# Patient Record
Sex: MALE | Race: BLACK OR AFRICAN AMERICAN | Employment: UNEMPLOYED | URBAN - METROPOLITAN AREA
[De-identification: names, ages, dates, MRNs, and addresses within clinical notes are randomized per-mention and may not be internally consistent; named-entity substitution may affect disease eponyms.]

---

## 2024-09-04 ENCOUNTER — APPOINTMENT (OUTPATIENT)
Dept: GENERAL RADIOLOGY | Facility: HOSPITAL | Age: 39
End: 2024-09-04
Attending: NURSE PRACTITIONER

## 2024-09-04 ENCOUNTER — HOSPITAL ENCOUNTER (EMERGENCY)
Facility: HOSPITAL | Age: 39
Discharge: HOME | End: 2024-09-04

## 2024-09-04 VITALS
WEIGHT: 190 LBS | HEART RATE: 85 BPM | TEMPERATURE: 97 F | OXYGEN SATURATION: 98 % | RESPIRATION RATE: 16 BRPM | DIASTOLIC BLOOD PRESSURE: 105 MMHG | SYSTOLIC BLOOD PRESSURE: 154 MMHG

## 2024-09-04 DIAGNOSIS — S41.112A LACERATION OF LEFT UPPER EXTREMITY, INITIAL ENCOUNTER: Primary | ICD-10-CM

## 2024-09-04 DIAGNOSIS — S99.911A ANKLE INJURY, RIGHT, INITIAL ENCOUNTER: ICD-10-CM

## 2024-09-04 PROCEDURE — 99283 EMERGENCY DEPT VISIT LOW MDM: CPT

## 2024-09-04 PROCEDURE — 90471 IMMUNIZATION ADMIN: CPT

## 2024-09-04 PROCEDURE — 73610 X-RAY EXAM OF ANKLE: CPT | Performed by: NURSE PRACTITIONER

## 2024-09-04 RX ORDER — BICTEGRAVIR SODIUM, EMTRICITABINE, AND TENOFOVIR ALAFENAMIDE FUMARATE 50; 200; 25 MG/1; MG/1; MG/1
1 TABLET ORAL DAILY
COMMUNITY

## 2024-09-04 NOTE — ED INITIAL ASSESSMENT (HPI)
Pt to ED via triage c/o approx 4-5 in lac to left upper arm by dull sword pta. Pt applied neosporin to lac. Bleeding controlled in triage.  Also c/o right ankle pain, able to ambulate.

## 2024-12-04 ENCOUNTER — HOSPITAL ENCOUNTER (EMERGENCY)
Facility: HOSPITAL | Age: 39
Discharge: HOME | End: 2024-12-04
Attending: EMERGENCY MEDICINE

## 2024-12-04 ENCOUNTER — APPOINTMENT (OUTPATIENT)
Dept: CT IMAGING | Facility: HOSPITAL | Age: 39
End: 2024-12-04
Attending: EMERGENCY MEDICINE

## 2024-12-04 ENCOUNTER — APPOINTMENT (OUTPATIENT)
Dept: GENERAL RADIOLOGY | Facility: HOSPITAL | Age: 39
End: 2024-12-04
Attending: EMERGENCY MEDICINE

## 2024-12-04 VITALS
SYSTOLIC BLOOD PRESSURE: 129 MMHG | RESPIRATION RATE: 16 BRPM | TEMPERATURE: 100 F | OXYGEN SATURATION: 97 % | HEART RATE: 72 BPM | WEIGHT: 190.06 LBS | DIASTOLIC BLOOD PRESSURE: 83 MMHG

## 2024-12-04 DIAGNOSIS — R74.01 TRANSAMINITIS: ICD-10-CM

## 2024-12-04 DIAGNOSIS — B34.9 VIRAL SYNDROME: Primary | ICD-10-CM

## 2024-12-04 LAB
ALBUMIN SERPL-MCNC: 4.3 G/DL (ref 3.2–4.8)
ALP LIVER SERPL-CCNC: 110 U/L
ALT SERPL-CCNC: 59 U/L
ANION GAP SERPL CALC-SCNC: 8 MMOL/L (ref 0–18)
AST SERPL-CCNC: 77 U/L (ref ?–34)
BASOPHILS # BLD AUTO: 0.07 X10(3) UL (ref 0–0.2)
BASOPHILS NFR BLD AUTO: 0.8 %
BILIRUB DIRECT SERPL-MCNC: 0.2 MG/DL (ref ?–0.3)
BILIRUB SERPL-MCNC: 0.6 MG/DL (ref 0.3–1.2)
BILIRUB UR QL: NEGATIVE
BUN BLD-MCNC: 11 MG/DL (ref 9–23)
BUN/CREAT SERPL: 8.5 (ref 10–20)
CALCIUM BLD-MCNC: 9.5 MG/DL (ref 8.7–10.4)
CHLORIDE SERPL-SCNC: 106 MMOL/L (ref 98–112)
CLARITY UR: CLEAR
CO2 SERPL-SCNC: 26 MMOL/L (ref 21–32)
CREAT BLD-MCNC: 1.3 MG/DL
DEPRECATED RDW RBC AUTO: 39.5 FL (ref 35.1–46.3)
EGFRCR SERPLBLD CKD-EPI 2021: 73 ML/MIN/1.73M2 (ref 60–?)
EOSINOPHIL # BLD AUTO: 0.06 X10(3) UL (ref 0–0.7)
EOSINOPHIL NFR BLD AUTO: 0.7 %
ERYTHROCYTE [DISTWIDTH] IN BLOOD BY AUTOMATED COUNT: 13.1 % (ref 11–15)
FLUAV + FLUBV RNA SPEC NAA+PROBE: NEGATIVE
FLUAV + FLUBV RNA SPEC NAA+PROBE: NEGATIVE
GLUCOSE BLD-MCNC: 80 MG/DL (ref 70–99)
GLUCOSE UR-MCNC: NORMAL MG/DL
HAV IGM SER QL: NONREACTIVE
HBV CORE IGM SER QL: NONREACTIVE
HBV SURFACE AG SERPL QL IA: NONREACTIVE
HCT VFR BLD AUTO: 38 %
HCV AB SERPL QL IA: NONREACTIVE
HGB BLD-MCNC: 12.7 G/DL
HGB UR QL STRIP.AUTO: NEGATIVE
IMM GRANULOCYTES # BLD AUTO: 0.03 X10(3) UL (ref 0–1)
IMM GRANULOCYTES NFR BLD: 0.3 %
KETONES UR-MCNC: NEGATIVE MG/DL
LEUKOCYTE ESTERASE UR QL STRIP.AUTO: NEGATIVE
LYMPHOCYTES # BLD AUTO: 1.7 X10(3) UL (ref 1–4)
LYMPHOCYTES NFR BLD AUTO: 19.7 %
MCH RBC QN AUTO: 27.5 PG (ref 26–34)
MCHC RBC AUTO-ENTMCNC: 33.4 G/DL (ref 31–37)
MCV RBC AUTO: 82.4 FL
MONOCYTES # BLD AUTO: 1.07 X10(3) UL (ref 0.1–1)
MONOCYTES NFR BLD AUTO: 12.4 %
NEUTROPHILS # BLD AUTO: 5.71 X10 (3) UL (ref 1.5–7.7)
NEUTROPHILS # BLD AUTO: 5.71 X10(3) UL (ref 1.5–7.7)
NEUTROPHILS NFR BLD AUTO: 66.1 %
NITRITE UR QL STRIP.AUTO: NEGATIVE
OSMOLALITY SERPL CALC.SUM OF ELEC: 288 MOSM/KG (ref 275–295)
PH UR: 7.5 [PH] (ref 5–8)
PLATELET # BLD AUTO: 242 10(3)UL (ref 150–450)
POTASSIUM SERPL-SCNC: 4.5 MMOL/L (ref 3.5–5.1)
PROT SERPL-MCNC: 7.2 G/DL (ref 5.7–8.2)
PROT UR-MCNC: NEGATIVE MG/DL
RBC # BLD AUTO: 4.61 X10(6)UL
RSV RNA SPEC NAA+PROBE: NEGATIVE
SARS-COV-2 RNA RESP QL NAA+PROBE: NOT DETECTED
SODIUM SERPL-SCNC: 140 MMOL/L (ref 136–145)
SP GR UR STRIP: 1.02 (ref 1–1.03)
UROBILINOGEN UR STRIP-ACNC: 4
WBC # BLD AUTO: 8.6 X10(3) UL (ref 4–11)

## 2024-12-04 PROCEDURE — 81003 URINALYSIS AUTO W/O SCOPE: CPT | Performed by: EMERGENCY MEDICINE

## 2024-12-04 PROCEDURE — 99285 EMERGENCY DEPT VISIT HI MDM: CPT

## 2024-12-04 PROCEDURE — 80048 BASIC METABOLIC PNL TOTAL CA: CPT | Performed by: EMERGENCY MEDICINE

## 2024-12-04 PROCEDURE — 0241U SARS-COV-2/FLU A AND B/RSV BY PCR (GENEXPERT): CPT | Performed by: EMERGENCY MEDICINE

## 2024-12-04 PROCEDURE — 36415 COLL VENOUS BLD VENIPUNCTURE: CPT

## 2024-12-04 PROCEDURE — 71045 X-RAY EXAM CHEST 1 VIEW: CPT | Performed by: EMERGENCY MEDICINE

## 2024-12-04 PROCEDURE — 80074 ACUTE HEPATITIS PANEL: CPT | Performed by: EMERGENCY MEDICINE

## 2024-12-04 PROCEDURE — 87040 BLOOD CULTURE FOR BACTERIA: CPT | Performed by: EMERGENCY MEDICINE

## 2024-12-04 PROCEDURE — 80076 HEPATIC FUNCTION PANEL: CPT | Performed by: EMERGENCY MEDICINE

## 2024-12-04 PROCEDURE — 74177 CT ABD & PELVIS W/CONTRAST: CPT | Performed by: EMERGENCY MEDICINE

## 2024-12-04 PROCEDURE — 85025 COMPLETE CBC W/AUTO DIFF WBC: CPT | Performed by: EMERGENCY MEDICINE

## 2024-12-04 PROCEDURE — 99284 EMERGENCY DEPT VISIT MOD MDM: CPT

## 2024-12-04 RX ORDER — ACETAMINOPHEN 325 MG/1
650 TABLET ORAL ONCE
Status: COMPLETED | OUTPATIENT
Start: 2024-12-04 | End: 2024-12-04

## 2024-12-04 RX ORDER — IBUPROFEN 600 MG/1
600 TABLET, FILM COATED ORAL ONCE
Status: COMPLETED | OUTPATIENT
Start: 2024-12-04 | End: 2024-12-04

## 2024-12-04 NOTE — ED QUICK NOTES
Patient presents to ED Pod [address] [place] from triage with c/o flu-like symptoms including fever, body aches, chills and overall \"I don't feel good\". Patient took a Tylenol PTA with a temp in triage of 100.4F-will wait 30 more minutes and recheck temp, and treat appropriately. Patient has been taking Tylenol cold/flu since yesterday. Patient is A&O x 4. No distress noted. Respirations regular and unlabored. Call light at reach.    Father at bedside.

## 2024-12-04 NOTE — ED QUICK NOTES
Rounding Completed    Plan of Care reviewed. Waiting for XRAY and urine sample results.  Elimination needs assessed, patient ambulated to bathroom with steady gait to provide urine sample.  Respirations regular and unlabored.  Father at bedside.    Bed is locked and in lowest position. Call light within reach.

## 2024-12-04 NOTE — ED INITIAL ASSESSMENT (HPI)
Patient c/o headache and body aches/chills. He's here because he doesn't feel good. Denies cough.

## 2024-12-05 NOTE — ED PROVIDER NOTES
Signotu taken with disposition pending labs/imaging - same nonacute as below aside from transaminitis in patient without right sided pain for which patient/father advised of same and need for ongoing monitoring/followup.    Recent Results (from the past 24 hours)   SARS-CoV-2/Flu A and B/RSV by PCR (GeneXpert)    Collection Time: 12/04/24  4:53 PM    Specimen: Nares; Other   Result Value Ref Range    SARS-CoV-2 (COVID-19) - (GeneXpert) Not Detected Not Detected    Influenza A by PCR Negative Negative    Influenza B by PCR Negative Negative    RSV by PCR Negative Negative   Urinalysis with Culture Reflex    Collection Time: 12/04/24  6:16 PM    Specimen: Urine, clean catch   Result Value Ref Range    Urine Color Light-Yellow Yellow    Clarity Urine Clear Clear    Spec Gravity 1.025 1.005 - 1.030    Glucose Urine Normal Normal mg/dL    Bilirubin Urine Negative Negative    Ketones Urine Negative Negative mg/dL    Blood Urine Negative Negative    pH Urine 7.5 5.0 - 8.0    Protein Urine Negative Negative mg/dL    Urobilinogen Urine 4 (A) Normal    Nitrite Urine Negative Negative    Leukocyte Esterase Urine Negative Negative    Microscopic Microscopic not indicated    Basic Metabolic Panel (8)    Collection Time: 12/04/24  6:52 PM   Result Value Ref Range    Glucose 80 70 - 99 mg/dL    Sodium 140 136 - 145 mmol/L    Potassium 4.5 3.5 - 5.1 mmol/L    Chloride 106 98 - 112 mmol/L    CO2 26.0 21.0 - 32.0 mmol/L    Anion Gap 8 0 - 18 mmol/L    BUN 11 9 - 23 mg/dL    Creatinine 1.30 0.70 - 1.30 mg/dL    BUN/CREA Ratio 8.5 (L) 10.0 - 20.0    Calcium, Total 9.5 8.7 - 10.4 mg/dL    Calculated Osmolality 288 275 - 295 mOsm/kg    eGFR-Cr 73 >=60 mL/min/1.73m2   CBC With Differential With Platelet    Collection Time: 12/04/24  6:52 PM   Result Value Ref Range    WBC 8.6 4.0 - 11.0 x10(3) uL    RBC 4.61 4.30 - 5.70 x10(6)uL    HGB 12.7 (L) 13.0 - 17.5 g/dL    HCT 38.0 (L) 39.0 - 53.0 %    MCV 82.4 80.0 - 100.0 fL    MCH 27.5 26.0 -  34.0 pg    MCHC 33.4 31.0 - 37.0 g/dL    RDW-SD 39.5 35.1 - 46.3 fL    RDW 13.1 11.0 - 15.0 %    .0 150.0 - 450.0 10(3)uL    Neutrophil Absolute Prelim 5.71 1.50 - 7.70 x10 (3) uL    Neutrophil Absolute 5.71 1.50 - 7.70 x10(3) uL    Lymphocyte Absolute 1.70 1.00 - 4.00 x10(3) uL    Monocyte Absolute 1.07 (H) 0.10 - 1.00 x10(3) uL    Eosinophil Absolute 0.06 0.00 - 0.70 x10(3) uL    Basophil Absolute 0.07 0.00 - 0.20 x10(3) uL    Immature Granulocyte Absolute 0.03 0.00 - 1.00 x10(3) uL    Neutrophil % 66.1 %    Lymphocyte % 19.7 %    Monocyte % 12.4 %    Eosinophil % 0.7 %    Basophil % 0.8 %    Immature Granulocyte % 0.3 %   Hepatic Function Panel (7)    Collection Time: 12/04/24  6:52 PM   Result Value Ref Range    AST 77 (H) <34 U/L    ALT 59 (H) 10 - 49 U/L    Alkaline Phosphatase 110 45 - 117 U/L    Bilirubin, Total 0.6 0.3 - 1.2 mg/dL    Bilirubin, Direct 0.2 <=0.3 mg/dL    Total Protein 7.2 5.7 - 8.2 g/dL    Albumin 4.3 3.2 - 4.8 g/dL     CT ABDOMEN+PELVIS(CONTRAST ONLY)(CPT=74177)    Result Date: 12/4/2024  PROCEDURE: CT ABDOMEN + PELVIS (CONTRAST ONLY) (CPT=74177)  COMPARISON: None.  INDICATIONS: Chills, fever, upper abd pain  TECHNIQUE: CT images of the abdomen and pelvis were obtained with non-ionic intravenous contrast material.  Automated exposure control for dose reduction was used. Adjustment of the mA and/or kV was done based on the patient's size. Use of iterative reconstruction technique for dose reduction was used.  Dose information is transmitted to the ACR (American College of Radiology) NRDR (National Radiology Data Registry) which includes the Dose Index Registry.  FINDINGS:  Normal lower chest  Normal liver and gallbladder  Normal pancreas and spleen  Normal adrenals and kidneys  Normal aorta.  No adenopathy or ascites  Moderate colonic stool.  No obstruction.  Normal appendix  Normal bladder  No bone lesion  CONCLUSION: No acute or concerning findings Finalized by (CST): Farzad Solares,  MD on 12/04/2024 at 8:19 PM          XR CHEST AP PORTABLE  (CPT=71045)    Result Date: 12/4/2024  PROCEDURE: XR CHEST AP PORTABLE  (CPT=71045) TIME: 1757  COMPARISON: None.  INDICATIONS: Chills x 2 days.  TECHNIQUE:   Single view.   Findings and impression:  Normal heart size, clear lungs, normal pleura Finalized by (CST): Farzad Solares MD on 12/04/2024 at 6:14 PM

## 2024-12-05 NOTE — ED QUICK NOTES
Rounding Completed    Plan of Care reviewed. Waiting for disposition.  Elimination needs assessed.  Patient resting comfortably, with visible regular respirations. No distress noted.  Father at bedside.    Bed is locked and in lowest position. Call light within reach.

## 2024-12-05 NOTE — ED PROVIDER NOTES
Patient Seen in: [facility] Emergency Department      History     Chief Complaint   Patient presents with    Cough/URI     Stated Complaint: Chills    Subjective:   HPI      38-year-old male here with his godfather for evaluation of chills and fever and not feeling well today.  He reports mild runny nose.  Mild left flank pain.  No urinary or GI symptoms.  No recent antibiotic therapy.  He reports he is compliant with his Biktarvy medications and has reported normal viral loads and CD4 counts.  No recent travel.    Objective:     Past Medical History:    HIV (human immunodeficiency virus infection) (HCC)              History reviewed. No pertinent surgical history.             Social History     Socioeconomic History    Marital status: Single   Tobacco Use    Smoking status: Never    Smokeless tobacco: Never   Vaping Use    Vaping status: Never Used   Substance and Sexual Activity    Alcohol use: Yes     Comment: socially    Drug use: Never                  Physical Exam     ED Triage Vitals   BP 12/04/24 1614 (!) 154/95   Pulse 12/04/24 1614 112   Resp 12/04/24 1614 24   Temp 12/04/24 1614 100.4 °F (38 °C)   Temp src 12/04/24 1715 Oral   SpO2 12/04/24 1614 98 %   O2 Device 12/04/24 1614 None (Room air)       Current Vitals:   Vital Signs  BP: 129/83  Pulse: 72  Resp: 16  Temp: 100.4 °F (38 °C)  Temp src: Oral  MAP (mmHg): 96    Oxygen Therapy  SpO2: 97 %  O2 Device: None (Room air)        Physical Exam  Constitutional: Oriented to person, place, and time.  Appears well-developed. No distress.   Head: Normocephalic and atraumatic.   Eyes: Conjunctivae are normal. Pupils are equal, round, and reactive to light.   Neck: Normal range of motion. Neck supple.  No stiffness or meningismus or lymphadenopathy.  Cardiovascular: Normal rate, regular rhythm and intact distal pulses.    Pulmonary/Chest: Effort normal. No respiratory distress.  No audible wheeze or crackles mild pain in the lateral left flank and  slightly lower region.  No other focal abdominal pain or guarding  Abdominal: Soft. There is no tenderness.   Musculoskeletal: Normal range of motion. No edema or tenderness.   Neurological: Alert and oriented to person, place, and time.   Skin: Skin is warm and dry.   Psychiatric: Normal mood and affect.  Behavior is normal.   Nursing note and vitals reviewed.    Differential diagnosis includes viral syndrome, UTI.      ED Course     Labs Reviewed   URINALYSIS WITH CULTURE REFLEX - Abnormal; Notable for the following components:       Result Value    Urobilinogen Urine 4 (*)     All other components within normal limits   BASIC METABOLIC PANEL (8) - Abnormal; Notable for the following components:    BUN/CREA Ratio 8.5 (*)     All other components within normal limits   CBC WITH DIFFERENTIAL WITH PLATELET - Abnormal; Notable for the following components:    HGB 12.7 (*)     HCT 38.0 (*)     Monocyte Absolute 1.07 (*)     All other components within normal limits   HEPATIC FUNCTION PANEL (7) - Abnormal; Notable for the following components:    AST 77 (*)     ALT 59 (*)     All other components within normal limits   HEPATITIS PANEL, ACUTE (4) - Normal   SARS-COV-2/FLU A AND B/RSV BY PCR (GENEXPERT) - Normal    Narrative:     This test is intended for the qualitative detection and differentiation of SARS-CoV-2, influenza A, influenza B, and respiratory syncytial virus (RSV) viral RNA in nasopharyngeal or nares swabs from individuals suspected of respiratory viral infection consistent with COVID-19 by their healthcare provider. Signs and symptoms of respiratory viral infection due to SARS-CoV-2, influenza, and RSV can be similar.    Test performed using the Xpert Xpress SARS-CoV-2/FLU/RSV (real time RT-PCR)  assay on the [employer]omy[employer] instrument, [employer], [place] [address].   This test is being used under the Food and Drug Administration's Emergency Use Authorization.    The authorized Fact Sheet for Healthcare Providers  for this assay is available upon request from the laboratory.   BLOOD CULTURE   BLOOD CULTURE            XR CHEST AP PORTABLE  (CPT=71045)    Result Date: 12/4/2024  PROCEDURE: XR CHEST AP PORTABLE  (CPT=71045) TIME: 1757  COMPARISON: None.  INDICATIONS: Chills x 2 days.  TECHNIQUE:   Single view.   Findings and impression:  Normal heart size, clear lungs, normal pleura Finalized by (CST): Farzad Solares MD on 12/04/2024 at 6:14 PM                [facility]              Medical Decision Making  Patient clinically looks well.  Developed a slightly higher fever but still nontoxic here in the emergency department.  Patient reports good compliance with his HIV medication regimen and reports to me nonconcerning viral load and CD4 counts.  His urine chest x-ray and viral swab are grossly unremarkable.  Because of his history I did choose to do labs and imaging based on his left-sided lateral abdominal pain.  These were signed out to Dr. Panotja for follow-up.  Anticipate likely discharge home with symptomatic therapy with fluids rest Motrin or Tylenol.  He should continue his HIV medication regimen as well.  I see no indication at this time for admission to the hospital.  Patient directed on follow-up and return assuming remaining disposition as listed above is negative.    Problems Addressed:  Viral syndrome: acute illness or injury with systemic symptoms    Amount and/or Complexity of Data Reviewed  Labs: ordered. Decision-making details documented in ED Course.  Radiology: ordered and independent interpretation performed. Decision-making details documented in ED Course.    Risk  OTC drugs.  Prescription drug management.  Decision regarding hospitalization.        Disposition and Plan     Clinical Impression:  1. Viral syndrome    2. Transaminitis         Disposition:  Discharge  12/4/2024  8:50 pm    Follow-up:  YOUR PRIMARY CARE DOCTOR    Call            Medications Prescribed:  Discharge Medication List as of 12/4/2024  9:36  PM              Supplementary Documentation:

## 2024-12-06 ENCOUNTER — HOSPITAL ENCOUNTER (EMERGENCY)
Facility: HOSPITAL | Age: 39
Discharge: HOME | End: 2024-12-06
Attending: EMERGENCY MEDICINE

## 2024-12-06 ENCOUNTER — APPOINTMENT (OUTPATIENT)
Dept: ULTRASOUND IMAGING | Facility: HOSPITAL | Age: 39
End: 2024-12-06
Attending: EMERGENCY MEDICINE

## 2024-12-06 VITALS
HEIGHT: 68 IN | WEIGHT: 170 LBS | DIASTOLIC BLOOD PRESSURE: 72 MMHG | BODY MASS INDEX: 25.76 KG/M2 | SYSTOLIC BLOOD PRESSURE: 128 MMHG | OXYGEN SATURATION: 98 % | TEMPERATURE: 103 F | HEART RATE: 92 BPM | RESPIRATION RATE: 20 BRPM

## 2024-12-06 DIAGNOSIS — R50.9 FEVER, UNSPECIFIED FEVER CAUSE: Primary | ICD-10-CM

## 2024-12-06 LAB
ADENOVIRUS PCR:: NOT DETECTED
ALBUMIN SERPL-MCNC: 4.1 G/DL (ref 3.2–4.8)
ALBUMIN/GLOB SERPL: 1.2 {RATIO} (ref 1–2)
ALP LIVER SERPL-CCNC: 122 U/L
ALT SERPL-CCNC: 76 U/L
ANION GAP SERPL CALC-SCNC: 8 MMOL/L (ref 0–18)
AST SERPL-CCNC: 68 U/L (ref ?–34)
B PARAPERT DNA SPEC QL NAA+PROBE: NOT DETECTED
B PERT DNA SPEC QL NAA+PROBE: NOT DETECTED
BASOPHILS # BLD AUTO: 0.08 X10(3) UL (ref 0–0.2)
BASOPHILS NFR BLD AUTO: 0.6 %
BILIRUB SERPL-MCNC: 0.8 MG/DL (ref 0.3–1.2)
BILIRUB UR QL: NEGATIVE
BUN BLD-MCNC: 13 MG/DL (ref 9–23)
BUN/CREAT SERPL: 9.2 (ref 10–20)
C PNEUM DNA SPEC QL NAA+PROBE: NOT DETECTED
CALCIUM BLD-MCNC: 9.3 MG/DL (ref 8.7–10.4)
CD3+CD4+ CELLS # BLD: 304 /MM3
CD3+CD4+ CELLS NFR BLD: 19 %
CD3+CD4+ CELLS/CD3+CD8+ CLL SPEC: 0.4
CD3+CD8+ CELLS NFR SPEC: 44 %
CHLORIDE SERPL-SCNC: 105 MMOL/L (ref 98–112)
CLARITY UR: CLEAR
CO2 SERPL-SCNC: 26 MMOL/L (ref 21–32)
COLOR UR: YELLOW
CORONAVIRUS 229E PCR:: NOT DETECTED
CORONAVIRUS HKU1 PCR:: NOT DETECTED
CORONAVIRUS NL63 PCR:: NOT DETECTED
CORONAVIRUS OC43 PCR:: NOT DETECTED
CREAT BLD-MCNC: 1.41 MG/DL
DEPRECATED RDW RBC AUTO: 39.1 FL (ref 35.1–46.3)
EGFRCR SERPLBLD CKD-EPI 2021: 66 ML/MIN/1.73M2 (ref 60–?)
EOSINOPHIL # BLD AUTO: 0.01 X10(3) UL (ref 0–0.7)
EOSINOPHIL NFR BLD AUTO: 0.1 %
ERYTHROCYTE [DISTWIDTH] IN BLOOD BY AUTOMATED COUNT: 13 % (ref 11–15)
FLUAV + FLUBV RNA SPEC NAA+PROBE: NEGATIVE
FLUAV + FLUBV RNA SPEC NAA+PROBE: NEGATIVE
FLUAV RNA SPEC QL NAA+PROBE: NOT DETECTED
FLUBV RNA SPEC QL NAA+PROBE: NOT DETECTED
GLOBULIN PLAS-MCNC: 3.4 G/DL (ref 2–3.5)
GLUCOSE BLD-MCNC: 115 MG/DL (ref 70–99)
GLUCOSE UR-MCNC: NORMAL MG/DL
HCT VFR BLD AUTO: 39.1 %
HETEROPH AB SER QL: NEGATIVE
HGB BLD-MCNC: 12.9 G/DL
HGB UR QL STRIP.AUTO: NEGATIVE
IMM GRANULOCYTES # BLD AUTO: 0.07 X10(3) UL (ref 0–1)
IMM GRANULOCYTES NFR BLD: 0.5 %
KETONES UR-MCNC: NEGATIVE MG/DL
LACTATE SERPL-SCNC: 0.8 MMOL/L (ref 0.5–2)
LEUKOCYTE ESTERASE UR QL STRIP.AUTO: NEGATIVE
LYMPHOCYTES # BLD AUTO: 1.58 X10(3) UL (ref 1–4)
LYMPHOCYTES NFR BLD AUTO: 11.4 %
MAGNESIUM SERPL-MCNC: 1.9 MG/DL (ref 1.6–2.6)
MCH RBC QN AUTO: 27 PG (ref 26–34)
MCHC RBC AUTO-ENTMCNC: 33 G/DL (ref 31–37)
MCV RBC AUTO: 81.8 FL
METAPNEUMOVIRUS PCR:: NOT DETECTED
MONOCYTES # BLD AUTO: 2.15 X10(3) UL (ref 0.1–1)
MONOCYTES NFR BLD AUTO: 15.6 %
MYCOPLASMA PNEUMONIA PCR:: NOT DETECTED
NEUTROPHILS # BLD AUTO: 9.93 X10 (3) UL (ref 1.5–7.7)
NEUTROPHILS # BLD AUTO: 9.93 X10(3) UL (ref 1.5–7.7)
NEUTROPHILS NFR BLD AUTO: 71.8 %
NITRITE UR QL STRIP.AUTO: NEGATIVE
OSMOLALITY SERPL CALC.SUM OF ELEC: 289 MOSM/KG (ref 275–295)
PARAINFLUENZA 1 PCR:: NOT DETECTED
PARAINFLUENZA 2 PCR:: NOT DETECTED
PARAINFLUENZA 3 PCR:: NOT DETECTED
PARAINFLUENZA 4 PCR:: NOT DETECTED
PH UR: 6 [PH] (ref 5–8)
PLATELET # BLD AUTO: 215 10(3)UL (ref 150–450)
POTASSIUM SERPL-SCNC: 4.2 MMOL/L (ref 3.5–5.1)
PROCALCITONIN SERPL-MCNC: 1.4 NG/ML (ref ?–0.05)
PROT SERPL-MCNC: 7.5 G/DL (ref 5.7–8.2)
PROT UR-MCNC: 30 MG/DL
RBC # BLD AUTO: 4.78 X10(6)UL
RHINOVIRUS/ENTERO PCR:: NOT DETECTED
RSV RNA SPEC NAA+PROBE: NEGATIVE
RSV RNA SPEC QL NAA+PROBE: NOT DETECTED
SARS-COV-2 RNA NPH QL NAA+NON-PROBE: NOT DETECTED
SARS-COV-2 RNA RESP QL NAA+PROBE: NOT DETECTED
SODIUM SERPL-SCNC: 139 MMOL/L (ref 136–145)
SP GR UR STRIP: 1.03 (ref 1–1.03)
T PALLIDUM AB SER QL IA: REACTIVE
UROBILINOGEN UR STRIP-ACNC: NORMAL
WBC # BLD AUTO: 13.8 X10(3) UL (ref 4–11)

## 2024-12-06 PROCEDURE — 87491 CHLMYD TRACH DNA AMP PROBE: CPT | Performed by: EMERGENCY MEDICINE

## 2024-12-06 PROCEDURE — 0202U NFCT DS 22 TRGT SARS-COV-2: CPT | Performed by: EMERGENCY MEDICINE

## 2024-12-06 PROCEDURE — 85060 BLOOD SMEAR INTERPRETATION: CPT | Performed by: EMERGENCY MEDICINE

## 2024-12-06 PROCEDURE — 96361 HYDRATE IV INFUSION ADD-ON: CPT

## 2024-12-06 PROCEDURE — 87536 HIV-1 QUANT&REVRSE TRNSCRPJ: CPT | Performed by: EMERGENCY MEDICINE

## 2024-12-06 PROCEDURE — 85025 COMPLETE CBC W/AUTO DIFF WBC: CPT | Performed by: EMERGENCY MEDICINE

## 2024-12-06 PROCEDURE — 86780 TREPONEMA PALLIDUM: CPT | Performed by: EMERGENCY MEDICINE

## 2024-12-06 PROCEDURE — 99285 EMERGENCY DEPT VISIT HI MDM: CPT

## 2024-12-06 PROCEDURE — 76705 ECHO EXAM OF ABDOMEN: CPT | Performed by: EMERGENCY MEDICINE

## 2024-12-06 PROCEDURE — 87591 N.GONORRHOEAE DNA AMP PROB: CPT | Performed by: EMERGENCY MEDICINE

## 2024-12-06 PROCEDURE — 80053 COMPREHEN METABOLIC PANEL: CPT | Performed by: EMERGENCY MEDICINE

## 2024-12-06 PROCEDURE — 83735 ASSAY OF MAGNESIUM: CPT | Performed by: EMERGENCY MEDICINE

## 2024-12-06 PROCEDURE — 86665 EPSTEIN-BARR CAPSID VCA: CPT | Performed by: EMERGENCY MEDICINE

## 2024-12-06 PROCEDURE — 81001 URINALYSIS AUTO W/SCOPE: CPT | Performed by: EMERGENCY MEDICINE

## 2024-12-06 PROCEDURE — 84145 PROCALCITONIN (PCT): CPT | Performed by: EMERGENCY MEDICINE

## 2024-12-06 PROCEDURE — 86308 HETEROPHILE ANTIBODY SCREEN: CPT | Performed by: EMERGENCY MEDICINE

## 2024-12-06 PROCEDURE — 86592 SYPHILIS TEST NON-TREP QUAL: CPT | Performed by: EMERGENCY MEDICINE

## 2024-12-06 PROCEDURE — 96360 HYDRATION IV INFUSION INIT: CPT

## 2024-12-06 PROCEDURE — 0241U SARS-COV-2/FLU A AND B/RSV BY PCR (GENEXPERT): CPT | Performed by: EMERGENCY MEDICINE

## 2024-12-06 PROCEDURE — 87040 BLOOD CULTURE FOR BACTERIA: CPT | Performed by: EMERGENCY MEDICINE

## 2024-12-06 PROCEDURE — 86664 EPSTEIN-BARR NUCLEAR ANTIGEN: CPT | Performed by: EMERGENCY MEDICINE

## 2024-12-06 PROCEDURE — 36415 COLL VENOUS BLD VENIPUNCTURE: CPT

## 2024-12-06 PROCEDURE — 86360 T CELL ABSOLUTE COUNT/RATIO: CPT | Performed by: EMERGENCY MEDICINE

## 2024-12-06 PROCEDURE — 83605 ASSAY OF LACTIC ACID: CPT | Performed by: EMERGENCY MEDICINE

## 2024-12-06 RX ORDER — ACETAMINOPHEN 500 MG
1000 TABLET ORAL ONCE
Status: COMPLETED | OUTPATIENT
Start: 2024-12-06 | End: 2024-12-06

## 2024-12-06 RX ORDER — IBUPROFEN 600 MG/1
600 TABLET, FILM COATED ORAL ONCE
Status: COMPLETED | OUTPATIENT
Start: 2024-12-06 | End: 2024-12-06

## 2024-12-06 NOTE — ED PROVIDER NOTES
Patient Seen in: [facility] Emergency Department      History     Chief Complaint   Patient presents with    Fever     Stated Complaint: fever    Subjective:   HPI      38-year-old male presents for evaluation for fever for the past 3 days.  Patient reports chills, body aches.  He denies any nausea, vomiting, cough, congestion, sore throat, earache, chest pain, shortness of breath, abdominal pain, diarrhea, constipation, dysuria, hematuria, rash.  He reports compliance with his Biktarvy.  His last CD4 and viral load were checked about 6 months ago.  He would like to be checked for STIs, he denies any current symptoms.  He denies any recent travel.    Objective:     Past Medical History:    HIV (human immunodeficiency virus infection) (HCC)              History reviewed. No pertinent surgical history.             Social History     Socioeconomic History    Marital status: Single   Tobacco Use    Smoking status: Never    Smokeless tobacco: Never   Vaping Use    Vaping status: Never Used   Substance and Sexual Activity    Alcohol use: Yes     Comment: socially    Drug use: Never                  Physical Exam     ED Triage Vitals [12/06/24 0530]   /71   Pulse 100   Resp 20   Temp (!) 103 °F (39.4 °C)   Temp src Oral   SpO2 97 %   O2 Device None (Room air)       Current Vitals:   Vital Signs  BP: 128/72  Pulse: 92  Resp: 20  Temp: (!) 103 °F (39.4 °C)  Temp src: Oral    Oxygen Therapy  SpO2: 98 %  O2 Device: None (Room air)        Physical Exam  Vitals and nursing note reviewed.   Constitutional:       General: He is not in acute distress.     Appearance: Normal appearance.   HENT:      Head: Normocephalic and atraumatic.      Jaw: No trismus.      Right Ear: Tympanic membrane normal. No mastoid tenderness.      Left Ear: Tympanic membrane normal. No mastoid tenderness.      Nose: Nose normal. No nasal deformity.      Right Nostril: No epistaxis.      Left Nostril: No epistaxis.      Mouth/Throat:       Lips: Pink.      Mouth: Mucous membranes are moist. No angioedema.      Pharynx: Oropharynx is clear. Uvula midline. No oropharyngeal exudate, posterior oropharyngeal erythema or uvula swelling.      Tonsils: No tonsillar exudate or tonsillar abscesses.   Eyes:      General: Lids are normal.      Extraocular Movements: Extraocular movements intact.      Conjunctiva/sclera: Conjunctivae normal.      Pupils: Pupils are equal, round, and reactive to light.   Cardiovascular:      Rate and Rhythm: Normal rate and regular rhythm.      Pulses: Normal pulses.      Heart sounds: Normal heart sounds.   Pulmonary:      Effort: Pulmonary effort is normal. No respiratory distress.      Breath sounds: Normal breath sounds and air entry.   Abdominal:      General: Bowel sounds are normal.      Palpations: Abdomen is soft.      Tenderness: There is no abdominal tenderness. There is no guarding or rebound.   Musculoskeletal:         General: No deformity. Normal range of motion.      Cervical back: Normal range of motion. No rigidity.   Skin:     General: Skin is warm and dry.      Coloration: Skin is not cyanotic.   Neurological:      General: No focal deficit present.      Mental Status: He is alert. Mental status is at baseline.      Motor: Motor function is intact.      Gait: Gait is intact.   Psychiatric:         Attention and Perception: Attention normal.         Mood and Affect: Mood normal.         Speech: Speech normal.             ED Course     Labs Reviewed   CBC WITH DIFFERENTIAL WITH PLATELET - Abnormal; Notable for the following components:       Result Value    WBC 13.8 (*)     HGB 12.9 (*)     Neutrophil Absolute Prelim 9.93 (*)     Neutrophil Absolute 9.93 (*)     Monocyte Absolute 2.15 (*)     All other components within normal limits   COMP METABOLIC PANEL (14) - Abnormal; Notable for the following components:    Glucose 115 (*)     Creatinine 1.41 (*)     BUN/CREA Ratio 9.2 (*)     ALT 76 (*)     AST 68 (*)      Alkaline Phosphatase 122 (*)     All other components within normal limits   URINALYSIS WITH CULTURE REFLEX - Abnormal; Notable for the following components:    Protein Urine 30 (*)     All other components within normal limits   PROCALCITONIN - Abnormal; Notable for the following components:    Procalcitonin 1.40 (*)     All other components within normal limits   CD4/CD8 RATIO PROFILE - Abnormal; Notable for the following components:    # T-HELPER CELLS (CD4) 304 (*)     % T-HELPER CELLS (CD4) 19 (*)     % T-SUPPRESSOR CELLS (CD8) 44 (*)     T-LYMPHOCYTE CD4/CD8 RATIO 0.4 (*)     All other components within normal limits   T PALLIDUM SCREENING CASCADE - Abnormal; Notable for the following components:    Treponemal Antibodies Reactive (*)     All other components within normal limits   LACTIC ACID, PLASMA - Normal   MAGNESIUM - Normal   SARS-COV-2/FLU A AND B/RSV BY PCR (GENEXPERT) - Normal    Narrative:     This test is intended for the qualitative detection and differentiation of SARS-CoV-2, influenza A, influenza B, and respiratory syncytial virus (RSV) viral RNA in nasopharyngeal or nares swabs from individuals suspected of respiratory viral infection consistent with COVID-19 by their healthcare provider. Signs and symptoms of respiratory viral infection due to SARS-CoV-2, influenza, and RSV can be similar.    Test performed using the Xpert Xpress SARS-CoV-2/FLU/RSV (real time RT-PCR)  assay on the GeneXpert instrument, [employer], [place] [address].   This test is being used under the Food and Drug Administration's Emergency Use Authorization.    The authorized Fact Sheet for Healthcare Providers for this assay is available upon request from the laboratory.   RESPIRATORY FLU EXPAND PANEL + COVID-19 - Normal    Narrative:     This test is intended for the simultaneous qualitative detection and differentiation of nucleic acids from multiple viral and bacterial respiratory organisms, including nucleic acid from  Severe Acute Respiratory Syndrome Coronavirus 2 (SARS-CoV-2) in nasopharyngeal swab from individuals suspected of respiratory viral infection consistent with COVID-19 by their healthcare provider.    Test performed using the [employer] Respiratory Panel 2.1 (RP2.1) assay on the [employer] System, [employer], [employer], [place] [address].    This test is being used under the Food and Drug Administration's Emergency Use Authorization.    The authorized Fact Sheet for Healthcare Providers for this assay is available upon request from the laboratory.    SARS and MERS coronaviruses are not tested on this assay.   SCAN SLIDE   MD BLOOD SMEAR CONSULT   HIV-1 RNA QUANTITATIVE PCR   MONO QUAL, RFX TO EBV-VCA ON NEG   RPR W/REFLEX TO TITER - TP-PA   CHLAMYDIA/GONOCOCCUS, FEDERICO   BLOOD CULTURE   BLOOD CULTURE       ED Course as of 12/06/24 1452  ------------------------------------------------------------  Time: 12/06 1133  Value: US GALLBLADDER (CPT=76705)  Comment: Per my independent interpretation, patient's US Gallbladder demonstrates no cholelithiasis.                MDM              Medical Decision Making  Differential diagnosis includes but is not limited to viral syndrome, bacterial infection, AIDS, etc.    Patient CBC shows a leukocytosis.  Chemistry showed mildly elevated creatinine and mild transaminitis.  Urinalysis without evidence of infection.  Blood cultures obtained.  Patient's lactic acid was normal.  Case was discussed with infectious disease Dr. Cruz who came and saw patient.  He recommends obtaining syphilis testing as well as mono testing.  Patient does have a history of syphilis in the past.  Gonorrhea chlamydia also been sent.  Viral panel was negative.  Patient's CD4 count was 304.  Ultrasound obtained given transaminitis and was negative for any cholelithiasis or choledocholithiasis.  ID recommends discharge home and will follow-up with patient as an outpatient.    Medical Record Review:  I personally reviewed available prior medical records for any recent pertinent discharge summaries, testing, and procedures, and reviewed those reports.    Complicating factors: The patient  has a past medical history of HIV (human immunodeficiency virus infection) (HCC). and  has no past surgical history on file. that contribute to the medical complexity of this ED evaluation.     Clinical impression as well as any lab results and radiology findings were discussed with the patient and/or caregiver. I personally reviewed all laboratory results and radiology images myself. Patient is advised to follow up with PCP for reevaluation. I provided discharge instructions and return precautions. Patient and/or caregiver voices understanding and agreement with the treatment plan. All questions were addressed and answered.         Problems Addressed:  Fever, unspecified fever cause: acute illness or injury with systemic symptoms    Amount and/or Complexity of Data Reviewed  External Data Reviewed: labs.     Details: Labs from 12/4/24 reviewed, hepatitis panel was negative, CT A/P negative for acute findings.   Labs: ordered. Decision-making details documented in ED Course.  Radiology: ordered and independent interpretation performed. Decision-making details documented in ED Course.  Discussion of management or test interpretation with external provider(s): See above discussion with Dr. Cruz        Disposition and Plan     Clinical Impression:  1. Fever, unspecified fever cause         Disposition:  Discharge  12/6/2024 12:30 pm    Follow-up:  Jose Cruz MD  [address]  [facility] [place] [address]  [phone]    Schedule an appointment as soon as possible for a visit  For follow up and re-evaluation          Medications Prescribed:  Current Discharge Medication List              Supplementary Documentation:

## 2024-12-06 NOTE — ED INITIAL ASSESSMENT (HPI)
Patient to ED for reported fever of 104.3 at home. Patient also reports chills and body aches. Patient reports taking tylenol at 9:30pm for fever but states it didn't work and hasn't taken more.

## 2024-12-06 NOTE — CONSULTS
[facility]  part of [facility] ID CONSULT NOTE    Pollo Kilpatrick Patient Status:  Emergency    1985 MRN [id number]   Location [facility] EMERGENCY DEPARTMENT Attending Paulo Dykes*   Hosp Day # 0 PCP No primary care provider on file.       Reason for Consultation:  Fever    ASSESSMENT:    Antibiotics: None    # Acute fever, leukocytosis x3 days with negative CXR, CT A/P, GB US, and BCx   - suspect viral etiology with multiple family members recently    # HIV on biktarvy - well controlled with CD4 on  304      PLAN:    -  monitor off abx  -  check BCx, monospot with reflex, RPR  -  Follow fever curve, wbc  -  Reviewed labs, micro, imaging reports, available old records  -  follow up with me as outpatient  -  d/w with patient, RN, Dr. Dykes    History of Present Illness:  Pollo Kilpatrick is a a(n) 38 year old male. Patient is a 38-year-old male with a history of HIV for over 15 years on Biktarvy and well-controlled with last labs 6 months ago.  Now with symptoms starting Monday night into Tuesday morning with chills, fevers, weakness, fatigue, decreased appetite.  Patient is a MSM with 2 partners in the last 3 months without condom use with last encounter on Monday.  Also had a family death at [facility] with multiple family members around.  Patient is a  and drives domestically.  Lives alone.  Denies any cough, sore throat, confusion, rhinorrhea, chest pain, nausea, vomiting, diarrhea, rash, penile discharge or lesions.  Did have some abdominal pain.  Was seen in the ED recently with negative blood culture, respiratory swab.  Chest x-ray without focal opacity and CT A/P negative.  Mildly elevated LFTs with gallbladder ultrasound unrevealing.    History:  Past Medical History:    HIV (human immunodeficiency virus infection) (HCC)     History reviewed. No pertinent surgical history.  No family history on file.   reports that  he has never smoked. He has never used smokeless tobacco. He reports current alcohol use. He reports that he does not use drugs.    Allergies:  Allergies[1]    Medications:  No current facility-administered medications for this encounter.    Review of Systems:  Per HPI    Physical Exam:  Vital signs: Blood pressure 122/71, pulse 100, temperature (!) 103 °F (39.4 °C), temperature source Oral, resp. rate 20, height 172.7 cm (5' 8\"), weight 170 lb (77.1 kg), SpO2 97%.    General: Alert, oriented, NAD  HEENT: Moist mucous membranes. EOMI; no oral lesions  Neck: No lymphadenopathy.  Supple.  Cardiovascular: No chest wall tenderness  Respiratory: Symmetric expansion  Abdomen: Soft, nontender, nondistended.   Musculoskeletal: No edema noted  Integument: No lesions. No erythema.    Laboratory Data: Reviewed in EMR    Microbiology: Reviewed in EMR    Radiology: Reviewed    Thank you for allowing us to participate in the care of this patient. Please do not hesitate to call if you have any questions.     We will continue to follow with you and will make further recommendations based on his progress.    Jose Cruz MD   [facility], [facility]  [phone]  12/6/2024         [1] No Known Allergies

## 2024-12-09 LAB
C TRACH DNA SPEC QL NAA+PROBE: NEGATIVE
EBV NA IGG SER QL IA: POSITIVE
EBV VCA IGG SER QL IA: POSITIVE
EBV VCA IGM SER QL IA: NEGATIVE
N GONORRHOEA DNA SPEC QL NAA+PROBE: NEGATIVE
RPR SER QL: NONREACTIVE

## 2024-12-11 LAB
TREPONEMAL ANTIBODIES, TPPA: REACTIVE
TREPONEMAL ANTIBODIES, TPPA: REACTIVE

## (undated) NOTE — LETTER
Date & Time: 12/6/2024, 2:32 PM  Patient: Pollo Kilpatrick  Encounter Provider(s):    Paulo Dykes MD       To Whom It May Concern:    Pollo Kilpatrick was seen and treated in our department on 12/6/2024. He should not return to work until 12/9/2024 .    If you have any questions or concerns, please do not hesitate to call.        _____________________________  Physician/APC Signature